# Patient Record
Sex: MALE | Race: WHITE | Employment: FULL TIME | URBAN - METROPOLITAN AREA
[De-identification: names, ages, dates, MRNs, and addresses within clinical notes are randomized per-mention and may not be internally consistent; named-entity substitution may affect disease eponyms.]

---

## 2022-08-05 ENCOUNTER — APPOINTMENT (OUTPATIENT)
Dept: GENERAL RADIOLOGY | Age: 32
End: 2022-08-05
Payer: COMMERCIAL

## 2022-08-05 ENCOUNTER — HOSPITAL ENCOUNTER (EMERGENCY)
Age: 32
Discharge: HOME | End: 2022-08-05
Attending: EMERGENCY MEDICINE
Payer: COMMERCIAL

## 2022-08-05 VITALS
SYSTOLIC BLOOD PRESSURE: 121 MMHG | WEIGHT: 209.22 LBS | BODY MASS INDEX: 29.29 KG/M2 | TEMPERATURE: 98.2 F | HEIGHT: 71 IN | DIASTOLIC BLOOD PRESSURE: 79 MMHG | RESPIRATION RATE: 20 BRPM | OXYGEN SATURATION: 97 % | HEART RATE: 55 BPM

## 2022-08-05 DIAGNOSIS — R07.9 CHEST PAIN, UNSPECIFIED TYPE: Primary | ICD-10-CM

## 2022-08-05 LAB
A/G RATIO: 1.9 (ref 1.1–2.2)
ALBUMIN SERPL-MCNC: 4.3 G/DL (ref 3.4–5)
ALP BLD-CCNC: 52 U/L (ref 40–129)
ALT SERPL-CCNC: 22 U/L (ref 10–40)
ANION GAP SERPL CALCULATED.3IONS-SCNC: 11 MMOL/L (ref 3–16)
AST SERPL-CCNC: 21 U/L (ref 15–37)
BASOPHILS ABSOLUTE: 0 K/UL (ref 0–0.2)
BASOPHILS RELATIVE PERCENT: 0.5 %
BILIRUB SERPL-MCNC: 0.7 MG/DL (ref 0–1)
BUN BLDV-MCNC: 11 MG/DL (ref 7–20)
CALCIUM SERPL-MCNC: 9.3 MG/DL (ref 8.3–10.6)
CHLORIDE BLD-SCNC: 103 MMOL/L (ref 99–110)
CO2: 26 MMOL/L (ref 21–32)
CREAT SERPL-MCNC: 0.8 MG/DL (ref 0.9–1.3)
EOSINOPHILS ABSOLUTE: 0.3 K/UL (ref 0–0.6)
EOSINOPHILS RELATIVE PERCENT: 5.8 %
GFR AFRICAN AMERICAN: >60
GFR NON-AFRICAN AMERICAN: >60
GLUCOSE BLD-MCNC: 104 MG/DL (ref 70–99)
HCT VFR BLD CALC: 40.7 % (ref 40.5–52.5)
HEMOGLOBIN: 14.3 G/DL (ref 13.5–17.5)
IMMATURE GRANULOCYTES #: 0 K/UL (ref 0–0.2)
IMMATURE GRANULOCYTES %: 0.2 %
LYMPHOCYTES ABSOLUTE: 1.9 K/UL (ref 1–5.1)
LYMPHOCYTES RELATIVE PERCENT: 35.3 %
MCH RBC QN AUTO: 30.7 PG (ref 26–34)
MCHC RBC AUTO-ENTMCNC: 35.1 G/DL (ref 32–36.4)
MCV RBC AUTO: 87.3 FL (ref 80–100)
MONOCYTES ABSOLUTE: 0.5 K/UL (ref 0–1.3)
MONOCYTES RELATIVE PERCENT: 8.5 %
NEUTROPHILS ABSOLUTE: 2.7 K/UL (ref 1.7–7.7)
NEUTROPHILS RELATIVE PERCENT: 49.7 %
PDW BLD-RTO: 12 % (ref 12.4–15.4)
PLATELET # BLD: 228 K/UL (ref 135–450)
PMV BLD AUTO: 9.2 FL (ref 5–10.5)
POTASSIUM SERPL-SCNC: 4.3 MMOL/L (ref 3.5–5.1)
RBC # BLD: 4.66 M/UL (ref 4.2–5.9)
SODIUM BLD-SCNC: 140 MMOL/L (ref 136–145)
TOTAL PROTEIN: 6.6 G/DL (ref 6.4–8.2)
TROPONIN: <0.01 NG/ML
WBC # BLD: 5.5 K/UL (ref 4–11)

## 2022-08-05 PROCEDURE — 71046 X-RAY EXAM CHEST 2 VIEWS: CPT

## 2022-08-05 PROCEDURE — 99284 EMERGENCY DEPT VISIT MOD MDM: CPT

## 2022-08-05 PROCEDURE — 80053 COMPREHEN METABOLIC PANEL: CPT

## 2022-08-05 PROCEDURE — 93005 ELECTROCARDIOGRAM TRACING: CPT | Performed by: EMERGENCY MEDICINE

## 2022-08-05 PROCEDURE — 84484 ASSAY OF TROPONIN QUANT: CPT

## 2022-08-05 PROCEDURE — 36415 COLL VENOUS BLD VENIPUNCTURE: CPT

## 2022-08-05 PROCEDURE — 85025 COMPLETE CBC W/AUTO DIFF WBC: CPT

## 2022-08-05 ASSESSMENT — PAIN DESCRIPTION - DESCRIPTORS: DESCRIPTORS: PRESSURE

## 2022-08-05 ASSESSMENT — PAIN DESCRIPTION - LOCATION: LOCATION: CHEST

## 2022-08-05 ASSESSMENT — PAIN - FUNCTIONAL ASSESSMENT: PAIN_FUNCTIONAL_ASSESSMENT: 0-10

## 2022-08-05 ASSESSMENT — HEART SCORE: ECG: 0

## 2022-08-05 ASSESSMENT — PAIN SCALES - GENERAL: PAINLEVEL_OUTOF10: 5

## 2022-08-05 ASSESSMENT — PAIN DESCRIPTION - ORIENTATION: ORIENTATION: MID;LEFT

## 2022-08-05 NOTE — ED TRIAGE NOTES
Patient came to ER from work with complaints of chest pain. Patient stated when it started he had shortness of breath, nausea and dizziness. It now has subsided besides the chest pressure. No history of heart issues.

## 2022-08-05 NOTE — ED PROVIDER NOTES
CHIEF COMPLAINT  Chief Complaint   Patient presents with    Chest Pain     Started about 10 minutes ago. Patient stated was nauseated and short of breath at the time it happened. Patient stated when the chest pain happened he was light headed at the time also. HISTORY OF PRESENT ILLNESS  Jesus Manuel Pierre is a 36 y.o. male who presents to the ED complaining of a 3-hour history of onset of left-sided sharp shooting pain in his arm that shot from his shoulder down to his hand that lasted approximately 45 seconds and was associated with a sharp pressure-like pain in the chest it lasted approximately the same amount of time. Patient had resolution of symptoms but he reports that this caused him to feel anxious, associated with shortness of breath and nausea with lightheadedness. At the time of assessment by me, all of the symptoms have resolved. Patient chews tobacco but is not a cigarette smoker. No history of DVT or PE. No history of heart or lung disease. Patient is otherwise healthy with no chronic medications. No other complaints, modifying factors or associated symptoms. Nursing notes reviewed. History reviewed. No pertinent past medical history. Past Surgical History:   Procedure Laterality Date    BACK SURGERY      TONSILLECTOMY       History reviewed. No pertinent family history. Social History     Socioeconomic History    Marital status: Single     Spouse name: Not on file    Number of children: Not on file    Years of education: Not on file    Highest education level: Not on file   Occupational History    Not on file   Tobacco Use    Smoking status: Never    Smokeless tobacco: Current     Types: Chew   Vaping Use    Vaping Use: Never used   Substance and Sexual Activity    Alcohol use:  Yes     Alcohol/week: 1.0 standard drink     Types: 1 Shots of liquor per week    Drug use: Never    Sexual activity: Not Currently   Other Topics Concern    Not on file   Social History Narrative    Not on file     Social Determinants of Health     Financial Resource Strain: Not on file   Food Insecurity: Not on file   Transportation Needs: Not on file   Physical Activity: Not on file   Stress: Not on file   Social Connections: Not on file   Intimate Partner Violence: Not on file   Housing Stability: Not on file     No current facility-administered medications for this encounter. No current outpatient medications on file. No Known Allergies    REVIEW OF SYSTEMS  Positives and pertinent negatives as per HPI. Six other systems were reviewed and are negative. Nursing notes pertaining to ROS were reviewed. PHYSICAL EXAM   General: Alert and oriented x 3, NAD. No increased work of breathing or accessory muscle use. Non-ill appearing. Appropriate and interactive  Eyes: PERRL, no scleral icterus, injection or exudate. EOMI. HENT: Atraumatic. Oral pharynx is clear, moist, no enanthem. No tonsillar hypertropy or exudate. Nasal mucous membranes are clear. TM's are clear without evidence of otitis media. Neck:  No Lymphadenopathy. Non-tender to palpation. Normal ROM. No JVD. No thyromegaly. No Mass. PULMONARY: Lungs clear bilaterally without wheezes, rales or rhonchi. Good air movement bilaterally. CV: Regular rate and rhythm without murmurs, rubs or gallops. ABD: Soft, non-tender, non-distended, normal bowel sounds, no hepatosplenomegaly, no masses. No peritoneal signs, rebound or guarding. Back:  No CVAT, no rash. EXT: No cyanosis or clubbing. No rash. CR < 2 seconds. No tenderness to palpation. No lower extremity edema. No calf tenderness or calf warmth. Patient does wear a foot drop brace on the left side secondary to previous paresis. Negative Homans' sign. +2 pulses in upper/lower extremities bilaterally. Skin is warm and dry.    PSYCH: normal affect    12 LEAD EKG AS INTERPRETED BY ME:  NSR  RATE OF 61  NORMAL AXIS   NORMAL INTERVALS  NO ST-T SIGNS OF ACUTE ISCHEMIA OR INFARCT     RADIOLOGY    XR CHEST (2 VW)   Final Result   No acute cardiopulmonary process. LABS  I have reviewed all labs for this visit. Results for orders placed or performed during the hospital encounter of 08/05/22   CBC with Auto Differential   Result Value Ref Range    WBC 5.5 4.0 - 11.0 K/uL    RBC 4.66 4.20 - 5.90 M/uL    Hemoglobin 14.3 13.5 - 17.5 g/dL    Hematocrit 40.7 40.5 - 52.5 %    MCV 87.3 80.0 - 100.0 fL    MCH 30.7 26.0 - 34.0 pg    MCHC 35.1 32.0 - 36.4 g/dL    RDW 12.0 (L) 12.4 - 15.4 %    Platelets 509 020 - 916 K/uL    MPV 9.2 5.0 - 10.5 fL    Neutrophils % 49.7 %    Immature Granulocytes % 0.2 %    Lymphocytes % 35.3 %    Monocytes % 8.5 %    Eosinophils % 5.8 %    Basophils % 0.5 %    Neutrophils Absolute 2.7 1.7 - 7.7 K/uL    Immature Granulocytes # 0.0 0.0 - 0.2 K/uL    Lymphocytes Absolute 1.9 1.0 - 5.1 K/uL    Monocytes Absolute 0.5 0.0 - 1.3 K/uL    Eosinophils Absolute 0.3 0.0 - 0.6 K/uL    Basophils Absolute 0.0 0.0 - 0.2 K/uL   CMP   Result Value Ref Range    Sodium 140 136 - 145 mmol/L    Potassium 4.3 3.5 - 5.1 mmol/L    Chloride 103 99 - 110 mmol/L    CO2 26 21 - 32 mmol/L    Anion Gap 11 3 - 16    Glucose 104 (H) 70 - 99 mg/dL    BUN 11 7 - 20 mg/dL    Creatinine 0.8 (L) 0.9 - 1.3 mg/dL    GFR Non-African American >60 >60    GFR African American >60 >60    Calcium 9.3 8.3 - 10.6 mg/dL    Total Protein 6.6 6.4 - 8.2 g/dL    Albumin 4.3 3.4 - 5.0 g/dL    Albumin/Globulin Ratio 1.9 1.1 - 2.2    Total Bilirubin 0.7 0.0 - 1.0 mg/dL    Alkaline Phosphatase 52 40 - 129 U/L    ALT 22 10 - 40 U/L    AST 21 15 - 37 U/L   Troponin   Result Value Ref Range    Troponin <0.01 <0.01 ng/mL           ED COURSE/MDM  Chest pain: Patient symptoms were very short duration, less than a minute with complete resolution. Patient's heart score is 0.   EKG and troponin are normal.  Patient's Wells and PERC rule are negative and patient does not require additional evaluation for DVT or PE. Patient has no tachycardia, tachypnea or hypoxia. Patient's chest x-ray is normal.  Patient's is deemed reasonable for outpatient follow-up and was advised to return to the emergency for any worsening or progressive symptoms. Hypertension:  Patient was hypertensive during the ER visit today. There are no signs of hypertensive emergency or evidence of end organ damage by history or physical exam.  These findings were discussed with the patient and advised to follow up with primary care physician to further assess and treat hypertension in the outpatient setting. The patient's blood pressure was found to be elevated according to CMS/Medicare and the Affordable Care Act/ObamaCare criteria. Elevated blood pressure could occur because of pain or anxiety or other reasons and does not mean that they need to have their blood pressure treated or medications otherwise adjusted. However, this could also be a sign that they will need to have their blood pressure treated or medications changed. The patient was instructed to take a list of recent blood pressure readings to their next visit with their personal physician. I estimate there is LOW risk for ACUTE CORONARY SYNDROME, PULMONARY EMBOLISM, AORTIC DISSECTION, PNEUMONIA, PNEUMOTHORAX, ACUTE CONGESTIVE HEART FAILURE, SEPSIS, DKA, ESOPHAGEAL RUPTURE, THORACIC ANEURYSM, thus I consider the discharge disposition reasonable. We also discussed returning to the Emergency Department immediately if new or worsening symptoms occur. We have discussed the symptoms which are most concerning (e.g., bloody sputum, fever, worsening pain or shortness of breath, vomiting) that necessitate immediate return. Patient was given scripts for the following medications. I counseled patient how to take these medications. New Prescriptions    No medications on file         CLINICAL IMPRESSION  1.  Chest pain, unspecified type        Blood pressure (!) 139/96, pulse 69, temperature 98.2 °F (36.8 °C), temperature source Oral, resp. rate 19, height 5' 11\" (1.803 m), weight 209 lb 3.5 oz (94.9 kg), SpO2 98 %.       Follow-up with:  [id number] [facility]  [phone]  In 3 days  If symptoms worsen    [address] [facility]              Danny Muhammad MD  08/05/22 1724

## 2022-08-05 NOTE — ED NOTES
Discharge instructions reviewed. Patient verbalized understanding. Patient will follow up with cardiology.        Gregg Joseph RN  08/05/22 [id number] prescription called to pharmacy

## 2022-08-07 LAB
EKG ATRIAL RATE: 61 BPM
EKG DIAGNOSIS: NORMAL
EKG P AXIS: 37 DEGREES
EKG P-R INTERVAL: 104 MS
EKG Q-T INTERVAL: 384 MS
EKG QRS DURATION: 88 MS
EKG QTC CALCULATION (BAZETT): 386 MS
EKG R AXIS: 37 DEGREES
EKG T AXIS: 24 DEGREES
EKG VENTRICULAR RATE: 61 BPM

## 2022-08-07 PROCEDURE — 93010 ELECTROCARDIOGRAM REPORT: CPT | Performed by: INTERNAL MEDICINE
